# Patient Record
Sex: FEMALE | Race: WHITE | HISPANIC OR LATINO | URBAN - METROPOLITAN AREA
[De-identification: names, ages, dates, MRNs, and addresses within clinical notes are randomized per-mention and may not be internally consistent; named-entity substitution may affect disease eponyms.]

---

## 2018-01-09 ENCOUNTER — EMERGENCY (EMERGENCY)
Facility: HOSPITAL | Age: 58
LOS: 1 days | Discharge: HOME | End: 2018-01-09
Attending: EMERGENCY MEDICINE
Payer: COMMERCIAL

## 2018-01-09 VITALS
RESPIRATION RATE: 18 BRPM | DIASTOLIC BLOOD PRESSURE: 81 MMHG | WEIGHT: 192.02 LBS | OXYGEN SATURATION: 98 % | HEIGHT: 59.84 IN | HEART RATE: 77 BPM | SYSTOLIC BLOOD PRESSURE: 151 MMHG | TEMPERATURE: 103 F

## 2018-01-09 VITALS
DIASTOLIC BLOOD PRESSURE: 74 MMHG | SYSTOLIC BLOOD PRESSURE: 113 MMHG | OXYGEN SATURATION: 98 % | TEMPERATURE: 99 F | HEART RATE: 68 BPM | RESPIRATION RATE: 14 BRPM

## 2018-01-09 LAB
ALBUMIN SERPL ELPH-MCNC: 3.4 G/DL — SIGNIFICANT CHANGE UP (ref 3.3–5.2)
ALP SERPL-CCNC: 80 U/L — SIGNIFICANT CHANGE UP (ref 40–120)
ALT FLD-CCNC: 54 U/L — HIGH
ANION GAP SERPL CALC-SCNC: 16 MMOL/L — SIGNIFICANT CHANGE UP (ref 5–17)
APPEARANCE UR: ABNORMAL
AST SERPL-CCNC: 29 U/L — SIGNIFICANT CHANGE UP
BACTERIA # UR AUTO: ABNORMAL
BASOPHILS # BLD AUTO: 0 K/UL — SIGNIFICANT CHANGE UP (ref 0–0.2)
BASOPHILS NFR BLD AUTO: 0.2 % — SIGNIFICANT CHANGE UP (ref 0–2)
BILIRUB SERPL-MCNC: 0.6 MG/DL — SIGNIFICANT CHANGE UP (ref 0.4–2)
BILIRUB UR-MCNC: ABNORMAL
BUN SERPL-MCNC: 13 MG/DL — SIGNIFICANT CHANGE UP (ref 8–20)
CALCIUM SERPL-MCNC: 8.4 MG/DL — LOW (ref 8.6–10.2)
CHLORIDE SERPL-SCNC: 93 MMOL/L — LOW (ref 98–107)
CO2 SERPL-SCNC: 25 MMOL/L — SIGNIFICANT CHANGE UP (ref 22–29)
COLOR SPEC: YELLOW — SIGNIFICANT CHANGE UP
CREAT SERPL-MCNC: 0.84 MG/DL — SIGNIFICANT CHANGE UP (ref 0.5–1.3)
DIFF PNL FLD: ABNORMAL
EOSINOPHIL # BLD AUTO: 0 K/UL — SIGNIFICANT CHANGE UP (ref 0–0.5)
EOSINOPHIL NFR BLD AUTO: 0 % — SIGNIFICANT CHANGE UP (ref 0–6)
EPI CELLS # UR: SIGNIFICANT CHANGE UP
GLUCOSE SERPL-MCNC: 120 MG/DL — HIGH (ref 70–115)
GLUCOSE UR QL: NEGATIVE MG/DL — SIGNIFICANT CHANGE UP
HCT VFR BLD CALC: 40.3 % — SIGNIFICANT CHANGE UP (ref 37–47)
HGB BLD-MCNC: 13.3 G/DL — SIGNIFICANT CHANGE UP (ref 12–16)
KETONES UR-MCNC: ABNORMAL
LACTATE BLDV-MCNC: 1 MMOL/L — SIGNIFICANT CHANGE UP (ref 0.5–2)
LEUKOCYTE ESTERASE UR-ACNC: ABNORMAL
LYMPHOCYTES # BLD AUTO: 0.5 K/UL — LOW (ref 1–4.8)
LYMPHOCYTES # BLD AUTO: 5.5 % — LOW (ref 20–55)
MCHC RBC-ENTMCNC: 29.2 PG — SIGNIFICANT CHANGE UP (ref 27–31)
MCHC RBC-ENTMCNC: 33 G/DL — SIGNIFICANT CHANGE UP (ref 32–36)
MCV RBC AUTO: 88.4 FL — SIGNIFICANT CHANGE UP (ref 81–99)
MONOCYTES # BLD AUTO: 0.6 K/UL — SIGNIFICANT CHANGE UP (ref 0–0.8)
MONOCYTES NFR BLD AUTO: 6.3 % — SIGNIFICANT CHANGE UP (ref 3–10)
NEUTROPHILS # BLD AUTO: 8.6 K/UL — HIGH (ref 1.8–8)
NEUTROPHILS NFR BLD AUTO: 87.7 % — HIGH (ref 37–73)
NITRITE UR-MCNC: NEGATIVE — SIGNIFICANT CHANGE UP
PH UR: 6 — SIGNIFICANT CHANGE UP (ref 5–8)
PLATELET # BLD AUTO: 198 K/UL — SIGNIFICANT CHANGE UP (ref 150–400)
POTASSIUM SERPL-MCNC: 3.9 MMOL/L — SIGNIFICANT CHANGE UP (ref 3.5–5.3)
POTASSIUM SERPL-SCNC: 3.9 MMOL/L — SIGNIFICANT CHANGE UP (ref 3.5–5.3)
PROT SERPL-MCNC: 7 G/DL — SIGNIFICANT CHANGE UP (ref 6.6–8.7)
PROT UR-MCNC: 100 MG/DL
RBC # BLD: 4.56 M/UL — SIGNIFICANT CHANGE UP (ref 4.4–5.2)
RBC # FLD: 13.8 % — SIGNIFICANT CHANGE UP (ref 11–15.6)
RBC CASTS # UR COMP ASSIST: SIGNIFICANT CHANGE UP /HPF (ref 0–4)
SODIUM SERPL-SCNC: 134 MMOL/L — LOW (ref 135–145)
SP GR SPEC: 1.02 — SIGNIFICANT CHANGE UP (ref 1.01–1.02)
UROBILINOGEN FLD QL: 4 MG/DL
WBC # BLD: 9.8 K/UL — SIGNIFICANT CHANGE UP (ref 4.8–10.8)
WBC # FLD AUTO: 9.8 K/UL — SIGNIFICANT CHANGE UP (ref 4.8–10.8)
WBC UR QL: SIGNIFICANT CHANGE UP

## 2018-01-09 PROCEDURE — 96374 THER/PROPH/DIAG INJ IV PUSH: CPT | Mod: XU

## 2018-01-09 PROCEDURE — 99284 EMERGENCY DEPT VISIT MOD MDM: CPT | Mod: 25

## 2018-01-09 PROCEDURE — 99284 EMERGENCY DEPT VISIT MOD MDM: CPT

## 2018-01-09 PROCEDURE — 87086 URINE CULTURE/COLONY COUNT: CPT

## 2018-01-09 PROCEDURE — T1013: CPT

## 2018-01-09 PROCEDURE — 83605 ASSAY OF LACTIC ACID: CPT

## 2018-01-09 PROCEDURE — 85027 COMPLETE CBC AUTOMATED: CPT

## 2018-01-09 PROCEDURE — 36415 COLL VENOUS BLD VENIPUNCTURE: CPT

## 2018-01-09 PROCEDURE — 80053 COMPREHEN METABOLIC PANEL: CPT

## 2018-01-09 PROCEDURE — 74177 CT ABD & PELVIS W/CONTRAST: CPT | Mod: 26

## 2018-01-09 PROCEDURE — 96375 TX/PRO/DX INJ NEW DRUG ADDON: CPT

## 2018-01-09 PROCEDURE — 71046 X-RAY EXAM CHEST 2 VIEWS: CPT | Mod: 26

## 2018-01-09 PROCEDURE — 74177 CT ABD & PELVIS W/CONTRAST: CPT

## 2018-01-09 PROCEDURE — 71046 X-RAY EXAM CHEST 2 VIEWS: CPT

## 2018-01-09 PROCEDURE — 81001 URINALYSIS AUTO W/SCOPE: CPT

## 2018-01-09 RX ORDER — ACETAMINOPHEN 500 MG
1000 TABLET ORAL ONCE
Qty: 0 | Refills: 0 | Status: COMPLETED | OUTPATIENT
Start: 2018-01-09 | End: 2018-01-09

## 2018-01-09 RX ORDER — ONDANSETRON 8 MG/1
4 TABLET, FILM COATED ORAL ONCE
Qty: 0 | Refills: 0 | Status: COMPLETED | OUTPATIENT
Start: 2018-01-09 | End: 2018-01-09

## 2018-01-09 RX ORDER — SODIUM CHLORIDE 9 MG/ML
3 INJECTION INTRAMUSCULAR; INTRAVENOUS; SUBCUTANEOUS ONCE
Qty: 0 | Refills: 0 | Status: COMPLETED | OUTPATIENT
Start: 2018-01-09 | End: 2018-01-09

## 2018-01-09 RX ORDER — SODIUM CHLORIDE 9 MG/ML
500 INJECTION INTRAMUSCULAR; INTRAVENOUS; SUBCUTANEOUS
Qty: 0 | Refills: 0 | Status: DISCONTINUED | OUTPATIENT
Start: 2018-01-09 | End: 2018-01-13

## 2018-01-09 RX ORDER — PANTOPRAZOLE SODIUM 20 MG/1
40 TABLET, DELAYED RELEASE ORAL ONCE
Qty: 0 | Refills: 0 | Status: COMPLETED | OUTPATIENT
Start: 2018-01-09 | End: 2018-01-09

## 2018-01-09 RX ADMIN — SODIUM CHLORIDE 3 MILLILITER(S): 9 INJECTION INTRAMUSCULAR; INTRAVENOUS; SUBCUTANEOUS at 16:34

## 2018-01-09 RX ADMIN — PANTOPRAZOLE SODIUM 40 MILLIGRAM(S): 20 TABLET, DELAYED RELEASE ORAL at 16:31

## 2018-01-09 RX ADMIN — SODIUM CHLORIDE 2000 MILLILITER(S): 9 INJECTION INTRAMUSCULAR; INTRAVENOUS; SUBCUTANEOUS at 16:30

## 2018-01-09 RX ADMIN — SODIUM CHLORIDE 2000 MILLILITER(S): 9 INJECTION INTRAMUSCULAR; INTRAVENOUS; SUBCUTANEOUS at 16:57

## 2018-01-09 RX ADMIN — ONDANSETRON 4 MILLIGRAM(S): 8 TABLET, FILM COATED ORAL at 16:33

## 2018-01-09 RX ADMIN — Medication 400 MILLIGRAM(S): at 16:57

## 2018-01-09 NOTE — ED STATDOCS - ATTENDING CONTRIBUTION TO CARE
I, La Mullen, performed the initial face to face bedside interview with this patient regarding history of present illness, review of symptoms and relevant past medical, social and family history.  I completed an independent physical examination.  I was the initial provider who evaluated this patient. I have signed out the follow up of any pending tests (i.e. labs, radiological studies) to the ACP.  I have communicated the patient’s plan of care and disposition with the ACP.

## 2018-01-09 NOTE — ED ADULT NURSE NOTE - OBJECTIVE STATEMENT
pt arrived to ED c/o of cough, upper abdominal pain radiating to flank, fever x 3 days. Pt describes pain as constant 9 out of 10 pain. Some constipation. Denies nausea, vomiting, diarrhea, sore throat, urinary symptoms.

## 2018-01-09 NOTE — ED ADULT TRIAGE NOTE - CHIEF COMPLAINT QUOTE
pt c/o fevers since sunday with pain to abd and lack of appetite, pt reports took motrin at 11 am  no urinary s/s  no vomiting  no diarrhea

## 2018-01-09 NOTE — ED STATDOCS - CARE PLAN
Principal Discharge DX:	Bronchitis  Instructions for follow-up, activity and diet:	take medication as directed. Follow up w PMD within 3 days. Return to ED w worsening symptoms.

## 2018-01-09 NOTE — ED STATDOCS - MEDICAL DECISION MAKING DETAILS
pt with abd pain and fever. unclear etiology. will give abx, CT, do sepsis workup pt with abd pain, cough and fever. unclear etiology. will give abx, CT, do sepsis workup

## 2018-01-09 NOTE — ED STATDOCS - PROGRESS NOTE DETAILS
PA NOTE:  Pt seen by intake physician and HPI/ROS/PE/MDM reviewed. PT presenting to ED with complaints of...  PE: GEN: Awake, alert, interactive, NAD, non-toxic appearing. EYES: PERRL CARDIAC: Reg rate and rhythm, S1,S2, no murmur/rub/gallop. Strong central and peripheral pulses, Brisk cap refill, no evident pedal edema. RESP: No distress noted. L/S clear = Bilat without accessory muscle use, wheeze, rhonchi, rales. ABD: soft, supple, non-tender, no guarding. BS x 4, normoactive. NEURO: AOx3, CN II-XII grossly intact without focal deficit. MSK: Moving all extremities with no apparent deformities. SKIN: Warm, dry, normal color, without apparent rashes.  PLAN: PA NOTE:  Pt seen by intake physician and HPI/ROS/PE/MDM reviewed. PT presenting to ED with complaints of   PE: GEN: Awake, alert, interactive, NAD, non-toxic appearing. EYES: PERRL CARDIAC: Reg rate and rhythm, S1,S2, no murmur/rub/gallop. Strong central and peripheral pulses, Brisk cap refill, no evident pedal edema. RESP: No distress noted. L/S clear = Bilat without accessory muscle use, wheeze, rhonchi, rales. ABD: soft, supple, non-tender, no guarding. BS x 4, normoactive. NEURO: AOx3, CN II-XII grossly intact without focal deficit. MSK: Moving all extremities with no apparent deformities. SKIN: Warm, dry, normal color, without apparent rashes.  PLAN: CT, CXR, labs PA NOTE:  Pt seen by intake physician and HPI/ROS/PE/MDM reviewed. PT presenting to ED with complaints of cough, fever, abdominal pain for past three days. Pt denies chest pain, sob, palpitations, hemoptysis, nausea/vomiting, diarrhea    PE: GEN: Morbidly obese, awake, alert, interactive, NAD, non-toxic appearing. EYES: PERRL CARDIAC: Reg rate and rhythm, S1,S2, no murmur/rub/gallop. Strong central and peripheral pulses, Brisk cap refill, no evident pedal edema. RESP: No distress noted. L/S clear = Bilat without accessory muscle use, wheeze, rhonchi, rales. ABD: soft, distended, protuberant abdomen,  non-tender, no guarding. BS x 4, normoactive. NEURO: AOx3, CN II-XII grossly intact without focal deficit. MSK: Moving all extremities with no apparent deformities. SKIN: Warm, dry, normal color, without apparent rashes.  PLAN: CT, CXR, labs Pt informed all results through . Discussed incidental findings enlarged lymph nodes on CT as well as hepatic nodule, and pulmonary congestion on CXR. Pt to be discharged to home w RX for Augmentin, advised to take as directed and to completion. ADvised to follow up w PMD within 3 days. Printout given of all results to bring to PMD. Pt expresses understanding and agreement with plan of discharge and follow up. Pt feels well, is afebrile and requesting discharge home. NAD noted and VSS.

## 2018-01-09 NOTE — ED STATDOCS - OBJECTIVE STATEMENT
56 y/o F pt presents to ED c/o of cough, upper abdominal pain radiating to flank, fever x 3 days. Pt describes pain as constant 9 out of 10 pain. Some constipation. Denies nausea, vomiting, diarrhea, sore throat, urinary symptoms. Denies similar symptoms in past.

## 2018-01-10 LAB
CULTURE RESULTS: SIGNIFICANT CHANGE UP
SPECIMEN SOURCE: SIGNIFICANT CHANGE UP

## 2020-09-23 ENCOUNTER — EMERGENCY (EMERGENCY)
Facility: HOSPITAL | Age: 60
LOS: 1 days | Discharge: HOME | End: 2020-09-23
Attending: EMERGENCY MEDICINE
Payer: COMMERCIAL

## 2020-09-23 VITALS
HEIGHT: 62 IN | DIASTOLIC BLOOD PRESSURE: 97 MMHG | WEIGHT: 190.04 LBS | RESPIRATION RATE: 18 BRPM | OXYGEN SATURATION: 98 % | TEMPERATURE: 98 F | HEART RATE: 87 BPM | SYSTOLIC BLOOD PRESSURE: 159 MMHG

## 2020-09-23 VITALS
HEART RATE: 69 BPM | OXYGEN SATURATION: 98 % | DIASTOLIC BLOOD PRESSURE: 80 MMHG | SYSTOLIC BLOOD PRESSURE: 124 MMHG | TEMPERATURE: 98 F | RESPIRATION RATE: 18 BRPM

## 2020-09-23 LAB
ALBUMIN SERPL ELPH-MCNC: 3.9 G/DL — SIGNIFICANT CHANGE UP (ref 3.3–5.2)
ALP SERPL-CCNC: 83 U/L — SIGNIFICANT CHANGE UP (ref 40–120)
ALT FLD-CCNC: 23 U/L — SIGNIFICANT CHANGE UP
ANION GAP SERPL CALC-SCNC: 11 MMOL/L — SIGNIFICANT CHANGE UP (ref 5–17)
AST SERPL-CCNC: 14 U/L — SIGNIFICANT CHANGE UP
BASOPHILS # BLD AUTO: 0.07 K/UL — SIGNIFICANT CHANGE UP (ref 0–0.2)
BASOPHILS NFR BLD AUTO: 1.2 % — SIGNIFICANT CHANGE UP (ref 0–2)
BILIRUB SERPL-MCNC: <0.2 MG/DL — LOW (ref 0.4–2)
BUN SERPL-MCNC: 11 MG/DL — SIGNIFICANT CHANGE UP (ref 8–20)
CALCIUM SERPL-MCNC: 8.8 MG/DL — SIGNIFICANT CHANGE UP (ref 8.6–10.2)
CHLORIDE SERPL-SCNC: 103 MMOL/L — SIGNIFICANT CHANGE UP (ref 98–107)
CO2 SERPL-SCNC: 25 MMOL/L — SIGNIFICANT CHANGE UP (ref 22–29)
CREAT SERPL-MCNC: 0.66 MG/DL — SIGNIFICANT CHANGE UP (ref 0.5–1.3)
EOSINOPHIL # BLD AUTO: 0.16 K/UL — SIGNIFICANT CHANGE UP (ref 0–0.5)
EOSINOPHIL NFR BLD AUTO: 2.7 % — SIGNIFICANT CHANGE UP (ref 0–6)
GLUCOSE SERPL-MCNC: 105 MG/DL — HIGH (ref 70–99)
HCT VFR BLD CALC: 38.2 % — SIGNIFICANT CHANGE UP (ref 34.5–45)
HGB BLD-MCNC: 12.4 G/DL — SIGNIFICANT CHANGE UP (ref 11.5–15.5)
IMM GRANULOCYTES NFR BLD AUTO: 0.2 % — SIGNIFICANT CHANGE UP (ref 0–1.5)
LYMPHOCYTES # BLD AUTO: 1.49 K/UL — SIGNIFICANT CHANGE UP (ref 1–3.3)
LYMPHOCYTES # BLD AUTO: 24.9 % — SIGNIFICANT CHANGE UP (ref 13–44)
MCHC RBC-ENTMCNC: 29.3 PG — SIGNIFICANT CHANGE UP (ref 27–34)
MCHC RBC-ENTMCNC: 32.5 GM/DL — SIGNIFICANT CHANGE UP (ref 32–36)
MCV RBC AUTO: 90.3 FL — SIGNIFICANT CHANGE UP (ref 80–100)
MONOCYTES # BLD AUTO: 0.62 K/UL — SIGNIFICANT CHANGE UP (ref 0–0.9)
MONOCYTES NFR BLD AUTO: 10.4 % — SIGNIFICANT CHANGE UP (ref 2–14)
NEUTROPHILS # BLD AUTO: 3.63 K/UL — SIGNIFICANT CHANGE UP (ref 1.8–7.4)
NEUTROPHILS NFR BLD AUTO: 60.6 % — SIGNIFICANT CHANGE UP (ref 43–77)
PLATELET # BLD AUTO: 355 K/UL — SIGNIFICANT CHANGE UP (ref 150–400)
POTASSIUM SERPL-MCNC: 4.5 MMOL/L — SIGNIFICANT CHANGE UP (ref 3.5–5.3)
POTASSIUM SERPL-SCNC: 4.5 MMOL/L — SIGNIFICANT CHANGE UP (ref 3.5–5.3)
PROT SERPL-MCNC: 7 G/DL — SIGNIFICANT CHANGE UP (ref 6.6–8.7)
RBC # BLD: 4.23 M/UL — SIGNIFICANT CHANGE UP (ref 3.8–5.2)
RBC # FLD: 12.3 % — SIGNIFICANT CHANGE UP (ref 10.3–14.5)
SODIUM SERPL-SCNC: 139 MMOL/L — SIGNIFICANT CHANGE UP (ref 135–145)
TROPONIN T SERPL-MCNC: <0.01 NG/ML — SIGNIFICANT CHANGE UP (ref 0–0.06)
TROPONIN T SERPL-MCNC: <0.01 NG/ML — SIGNIFICANT CHANGE UP (ref 0–0.06)
WBC # BLD: 5.98 K/UL — SIGNIFICANT CHANGE UP (ref 3.8–10.5)
WBC # FLD AUTO: 5.98 K/UL — SIGNIFICANT CHANGE UP (ref 3.8–10.5)

## 2020-09-23 PROCEDURE — 99284 EMERGENCY DEPT VISIT MOD MDM: CPT | Mod: 25

## 2020-09-23 PROCEDURE — 71045 X-RAY EXAM CHEST 1 VIEW: CPT

## 2020-09-23 PROCEDURE — 80053 COMPREHEN METABOLIC PANEL: CPT

## 2020-09-23 PROCEDURE — 36415 COLL VENOUS BLD VENIPUNCTURE: CPT

## 2020-09-23 PROCEDURE — 96374 THER/PROPH/DIAG INJ IV PUSH: CPT

## 2020-09-23 PROCEDURE — 99285 EMERGENCY DEPT VISIT HI MDM: CPT

## 2020-09-23 PROCEDURE — 85025 COMPLETE CBC W/AUTO DIFF WBC: CPT

## 2020-09-23 PROCEDURE — 93010 ELECTROCARDIOGRAM REPORT: CPT | Mod: 76

## 2020-09-23 PROCEDURE — 71045 X-RAY EXAM CHEST 1 VIEW: CPT | Mod: 26

## 2020-09-23 PROCEDURE — 93005 ELECTROCARDIOGRAM TRACING: CPT

## 2020-09-23 PROCEDURE — 84484 ASSAY OF TROPONIN QUANT: CPT

## 2020-09-23 RX ORDER — LIDOCAINE 4 G/100G
10 CREAM TOPICAL ONCE
Refills: 0 | Status: COMPLETED | OUTPATIENT
Start: 2020-09-23 | End: 2020-09-23

## 2020-09-23 RX ORDER — FAMOTIDINE 10 MG/ML
20 INJECTION INTRAVENOUS ONCE
Refills: 0 | Status: COMPLETED | OUTPATIENT
Start: 2020-09-23 | End: 2020-09-23

## 2020-09-23 RX ADMIN — FAMOTIDINE 20 MILLIGRAM(S): 10 INJECTION INTRAVENOUS at 14:05

## 2020-09-23 RX ADMIN — LIDOCAINE 10 MILLILITER(S): 4 CREAM TOPICAL at 14:04

## 2020-09-23 RX ADMIN — Medication 30 MILLILITER(S): at 14:04

## 2020-09-23 NOTE — ED ADULT NURSE NOTE - OBJECTIVE STATEMENT
Patient presents to ER complaining of epigastric pain, patient reports onset of symptoms Friday, denies N/V, resp even/unlabored, lungs CTAB, patient was seen at Urgent care and advised to come to ER for further evaluation.

## 2020-09-23 NOTE — ED ADULT NURSE NOTE - PAIN: PRESENCE, MLM
complains of pain/discomfort
gait, locomotion, and balance/muscle strength/aerobic capacity/endurance

## 2020-09-23 NOTE — ED PROVIDER NOTE - NS ED ROS FT
CONSTITUTIONAL: No fevers, no chills  Eyes: No vision changes  Cardiovascular: +Chest pain  Respiratory: No SOB  Gastrointestinal: No n/v/c/d, +abd pain  Genitourinary: no dysuria, no hematuria  SKIN: no rashes.  MSK: no weakness, no myalgias, no arthralgias  NEURO: no headache, no weakness, no numbness  PSYCHIATRIC: no known mental health issues.

## 2020-09-23 NOTE — ED PROVIDER NOTE - NSFOLLOWUPINSTRUCTIONS_ED_ALL_ED_FT
-- Please follow up with your doctor(s) within the next 3 days, but seek medical attention sooner if your symptoms persist or worsen.  Please call tomorrow for an appointment.  If you cannot follow-up with your primary doctor please return to the ED for any urgent issues.    -- You were given a copy of your labs and imaging.  Please go over these with your doctor(s).     -- If you have any worsening of symptoms or any other concerns please see your doctor or return to the nearest emergency room immediately.    -- Please continue taking your home medications as directed.  Do not use alcohol when taking any medication (especially antibiotics, tylenol or other pain medication) unless you check with the doctor or pharmacist.    **************************************************************************************************************  Please return to the emergency department immediately should you feel worse in any way or have any of the following symptoms:    •	especially increased or different pain  •	 fevers  •	persistent vomiting  •	shaking chills    Please return to the emergency department for a recheck in 12 hours so we can re-evaluate you and ensure that you are not developing a problem that would require surgery or hospitalization.      Please follow up with the Doctor listed within the time frame specified. Thank you for coming to the emergency department. We hope you are feeling improved and continue to get better. Have a nice day.    Chest Pain    Chest pain can be caused by many different conditions which may or may not be dangerous. Causes include heartburn, lung infections, heart attack, blood clot in lungs, skin infections, strain or damage to muscle, cartilage, or bones, etc. In addition to a history and physical examination, an electrocardiogram (ECG) or other lab tests may have been performed to determine the cause of your chest pain. Follow up with your primary care provider or with a cardiologist as instructed.     SEEK IMMEDIATE MEDICAL CARE IF YOU HAVE ANY OF THE FOLLOWING SYMPTOMS: worsening chest pain, coughing up blood, unexplained back/neck/jaw pain, severe abdominal pain, dizziness or lightheadedness, fainting, shortness of breath, sweaty or clammy skin, vomiting, or racing heart beat. These symptoms may represent a serious problem that is an emergency. Do not wait to see if the symptoms will go away. Get medical help right away. Call 911 and do not drive yourself to the hospital.

## 2020-09-23 NOTE — ED PROVIDER NOTE - ATTENDING CONTRIBUTION TO CARE
AJM: pt with epigastric and chest pain. symptoms intermittent for several days. started again this morning at 8 am. will obtain delta trop, ecg, labs, cxr, gi cocktail. pt has cards follow up will dc if neg workup with close follow up and return precautions.

## 2020-09-23 NOTE — ED PROVIDER NOTE - PHYSICAL EXAMINATION
General: well appearing, interactive, well nourished, NAD  HEENT: pupils equal and reactive, normal external ears bilaterally   Cardiac: RRR, no MRG appreciated  Resp: lungs clear to auscultation bilaterally, symmetric chest wall rise  Abd: soft, nontender, nondistended   : no CVA tenderness  Neuro: Moving all extremities  Skin:  vitiligo, normal color for race

## 2020-09-23 NOTE — ED ADULT TRIAGE NOTE - CHIEF COMPLAINT QUOTE
pt with c/o epigastric pain and left chest pain for past couple days, told by urgent care to come to ED.

## 2020-09-23 NOTE — ED PROVIDER NOTE - PATIENT PORTAL LINK FT
You can access the FollowMyHealth Patient Portal offered by [facility] by registering at the following website: [email or web address]. By joining [employer]’s FollowMyHealth portal, you will also be able to view your health information using other applications (apps) compatible with our system.

## 2020-09-23 NOTE — ED PROVIDER NOTE - OBJECTIVE STATEMENT
Pt is a 59 y/o F w/PMHx HTN presents c/o epigastric.  Pt states the pain started earlier in the week and she initially thought she had an upset stomach.  Today the pain began with radiation to her left chest and she called her PMD, was told to come to the ED.  Pt denies shortness of breath, n/v, leg swelling.

## 2020-09-23 NOTE — ED PROVIDER NOTE - CLINICAL SUMMARY MEDICAL DECISION MAKING FREE TEXT BOX
Pt is a 61 y/o F c/o epigastric pain w/radiation to chest, will get labs, trend trop, cxr, ekg, pt has good f/u

## 2020-09-23 NOTE — ED PROVIDER NOTE - PROGRESS NOTE DETAILS
AJM: pt feeling improved. workup neg. has card for follow up. All results discussed with the patient, and a copy of results has been provided. Patient is comfortable with dc plan for home. Opportunity for questions was provided and all questions have been addressed. Patient understands when to return to ED if symptoms worsen.

## 2021-01-07 ENCOUNTER — APPOINTMENT (OUTPATIENT)
Dept: OBGYN | Facility: CLINIC | Age: 61
End: 2021-01-07
Payer: MEDICAID

## 2021-01-07 VITALS
DIASTOLIC BLOOD PRESSURE: 72 MMHG | HEIGHT: 62 IN | BODY MASS INDEX: 36.28 KG/M2 | WEIGHT: 197.13 LBS | SYSTOLIC BLOOD PRESSURE: 130 MMHG

## 2021-01-07 DIAGNOSIS — Z78.9 OTHER SPECIFIED HEALTH STATUS: ICD-10-CM

## 2021-01-07 DIAGNOSIS — N76.2 ACUTE VULVITIS: ICD-10-CM

## 2021-01-07 DIAGNOSIS — N90.5 ATROPHY OF VULVA: ICD-10-CM

## 2021-01-07 DIAGNOSIS — Z86.79 PERSONAL HISTORY OF OTHER DISEASES OF THE CIRCULATORY SYSTEM: ICD-10-CM

## 2021-01-07 DIAGNOSIS — Z84.1 FAMILY HISTORY OF DISORDERS OF KIDNEY AND URETER: ICD-10-CM

## 2021-01-07 PROCEDURE — 99072 ADDL SUPL MATRL&STAF TM PHE: CPT

## 2021-01-07 PROCEDURE — 99386 PREV VISIT NEW AGE 40-64: CPT

## 2021-01-08 LAB
C TRACH RRNA SPEC QL NAA+PROBE: NOT DETECTED
HPV HIGH+LOW RISK DNA PNL CVX: NOT DETECTED
N GONORRHOEA RRNA SPEC QL NAA+PROBE: NOT DETECTED
SOURCE TP AMPLIFICATION: NORMAL

## 2021-01-12 LAB — CYTOLOGY CVX/VAG DOC THIN PREP: NORMAL

## 2022-11-06 ENCOUNTER — EMERGENCY (EMERGENCY)
Facility: HOSPITAL | Age: 62
LOS: 1 days | Discharge: HOME | End: 2022-11-06
Attending: EMERGENCY MEDICINE
Payer: COMMERCIAL

## 2022-11-06 VITALS
OXYGEN SATURATION: 99 % | TEMPERATURE: 99 F | RESPIRATION RATE: 16 BRPM | HEART RATE: 76 BPM | DIASTOLIC BLOOD PRESSURE: 70 MMHG | SYSTOLIC BLOOD PRESSURE: 119 MMHG

## 2022-11-06 VITALS
SYSTOLIC BLOOD PRESSURE: 106 MMHG | OXYGEN SATURATION: 98 % | HEART RATE: 66 BPM | DIASTOLIC BLOOD PRESSURE: 55 MMHG | RESPIRATION RATE: 18 BRPM

## 2022-11-06 LAB
ALBUMIN SERPL ELPH-MCNC: 4.5 G/DL — SIGNIFICANT CHANGE UP (ref 3.3–5.2)
ALP SERPL-CCNC: 82 U/L — SIGNIFICANT CHANGE UP (ref 40–120)
ALT FLD-CCNC: 15 U/L — SIGNIFICANT CHANGE UP
ANION GAP SERPL CALC-SCNC: 10 MMOL/L — SIGNIFICANT CHANGE UP (ref 5–17)
AST SERPL-CCNC: 14 U/L — SIGNIFICANT CHANGE UP
BILIRUB SERPL-MCNC: 0.3 MG/DL — LOW (ref 0.4–2)
BUN SERPL-MCNC: 24.4 MG/DL — HIGH (ref 8–20)
CALCIUM SERPL-MCNC: 9.2 MG/DL — SIGNIFICANT CHANGE UP (ref 8.4–10.5)
CHLORIDE SERPL-SCNC: 101 MMOL/L — SIGNIFICANT CHANGE UP (ref 96–108)
CO2 SERPL-SCNC: 26 MMOL/L — SIGNIFICANT CHANGE UP (ref 22–29)
CREAT SERPL-MCNC: 1.18 MG/DL — SIGNIFICANT CHANGE UP (ref 0.5–1.3)
EGFR: 52 ML/MIN/1.73M2 — LOW
GLUCOSE SERPL-MCNC: 100 MG/DL — HIGH (ref 70–99)
HCT VFR BLD CALC: 38.5 % — SIGNIFICANT CHANGE UP (ref 34.5–45)
HGB BLD-MCNC: 12.7 G/DL — SIGNIFICANT CHANGE UP (ref 11.5–15.5)
MCHC RBC-ENTMCNC: 30 PG — SIGNIFICANT CHANGE UP (ref 27–34)
MCHC RBC-ENTMCNC: 33 GM/DL — SIGNIFICANT CHANGE UP (ref 32–36)
MCV RBC AUTO: 91 FL — SIGNIFICANT CHANGE UP (ref 80–100)
PLATELET # BLD AUTO: 347 K/UL — SIGNIFICANT CHANGE UP (ref 150–400)
POTASSIUM SERPL-MCNC: 5 MMOL/L — SIGNIFICANT CHANGE UP (ref 3.5–5.3)
POTASSIUM SERPL-SCNC: 5 MMOL/L — SIGNIFICANT CHANGE UP (ref 3.5–5.3)
PROT SERPL-MCNC: 7.1 G/DL — SIGNIFICANT CHANGE UP (ref 6.6–8.7)
RBC # BLD: 4.23 M/UL — SIGNIFICANT CHANGE UP (ref 3.8–5.2)
RBC # FLD: 13 % — SIGNIFICANT CHANGE UP (ref 10.3–14.5)
SODIUM SERPL-SCNC: 137 MMOL/L — SIGNIFICANT CHANGE UP (ref 135–145)
TROPONIN T SERPL-MCNC: <0.01 NG/ML — SIGNIFICANT CHANGE UP (ref 0–0.06)
WBC # BLD: 7.68 K/UL — SIGNIFICANT CHANGE UP (ref 3.8–10.5)
WBC # FLD AUTO: 7.68 K/UL — SIGNIFICANT CHANGE UP (ref 3.8–10.5)

## 2022-11-06 PROCEDURE — 80053 COMPREHEN METABOLIC PANEL: CPT

## 2022-11-06 PROCEDURE — 99285 EMERGENCY DEPT VISIT HI MDM: CPT

## 2022-11-06 PROCEDURE — 84484 ASSAY OF TROPONIN QUANT: CPT

## 2022-11-06 PROCEDURE — 73030 X-RAY EXAM OF SHOULDER: CPT

## 2022-11-06 PROCEDURE — 85027 COMPLETE CBC AUTOMATED: CPT

## 2022-11-06 PROCEDURE — 36415 COLL VENOUS BLD VENIPUNCTURE: CPT

## 2022-11-06 PROCEDURE — 71045 X-RAY EXAM CHEST 1 VIEW: CPT | Mod: 26

## 2022-11-06 PROCEDURE — 93010 ELECTROCARDIOGRAM REPORT: CPT

## 2022-11-06 PROCEDURE — 71045 X-RAY EXAM CHEST 1 VIEW: CPT

## 2022-11-06 PROCEDURE — 93005 ELECTROCARDIOGRAM TRACING: CPT

## 2022-11-06 PROCEDURE — 73030 X-RAY EXAM OF SHOULDER: CPT | Mod: 26,LT

## 2022-11-06 RX ORDER — IBUPROFEN 200 MG
1 TABLET ORAL
Qty: 20 | Refills: 0
Start: 2022-11-06 | End: 2022-11-10

## 2022-11-06 RX ORDER — CYCLOBENZAPRINE HYDROCHLORIDE 10 MG/1
1 TABLET, FILM COATED ORAL
Qty: 15 | Refills: 0
Start: 2022-11-06 | End: 2022-11-10

## 2022-11-06 RX ORDER — IBUPROFEN 200 MG
400 TABLET ORAL ONCE
Refills: 0 | Status: COMPLETED | OUTPATIENT
Start: 2022-11-06 | End: 2022-11-06

## 2022-11-06 RX ORDER — ACETAMINOPHEN 500 MG
650 TABLET ORAL ONCE
Refills: 0 | Status: COMPLETED | OUTPATIENT
Start: 2022-11-06 | End: 2022-11-06

## 2022-11-06 RX ADMIN — Medication 650 MILLIGRAM(S): at 10:27

## 2022-11-06 RX ADMIN — Medication 400 MILLIGRAM(S): at 11:16

## 2022-11-06 RX ADMIN — Medication 400 MILLIGRAM(S): at 10:26

## 2022-11-06 RX ADMIN — Medication 650 MILLIGRAM(S): at 11:16

## 2022-11-06 NOTE — ED ADULT NURSE NOTE - OBJECTIVE STATEMENT
pt has been c/o pain to her left arm and side since thursday.  4/10  pt has appt with cardioliogy but wanted to be evulated as the pain has been getting worse.  denies any SOB.  Pt is A&Ox4

## 2022-11-06 NOTE — ED PROVIDER NOTE - NS ED ROS FT
Review of Systems  CONSTITUTIONAL: afebrile w/no diaphoresis or weight changes  SKIN: warm, dry w/ no rash or bleeding  EYES: no changes to vision  ENT: no changes in hearing, no sore throat  RESPIRATORY: no cough or SOB  CARDIAC: no chest pain & no palpitations  GI: no abd pain, nausea, vomiting, diarrhea, constipation, or blood in stool/mee blood  GENITO-URINARY: no discharge, dysuria or hematuria,   MUSCULOSKELETAL:  +LEFT SHOULDER PAIN, LEFT ARM TINGLING.   NEUROLOGIC: no weakness, headache, anesthesia or paresthesias  PSYCH: no anxiety, non suicidal, non homicidal, without hallucinations or depression

## 2022-11-06 NOTE — ED PROVIDER NOTE - CLINICAL SUMMARY MEDICAL DECISION MAKING FREE TEXT BOX
ASSESSMENT:   SERENITY RODAS is a 61yo F who presented with left shoulder pain. EKG w/o acute changes. Physical exam suggestive of msk source. Cxr clear.   Plan:  Symptomatic control, reassurance and education. Return precautions and outpatient cardiology follow up.

## 2022-11-06 NOTE — ED PROVIDER NOTE - PATIENT PORTAL LINK FT
You can access the FollowMyHealth Patient Portal offered by [facility] by registering at the following website: [email or web address]. By joining [employer]’s FollowMyHealth portal, you will also be able to view your health information using other applications (apps) compatible with our system.

## 2022-11-06 NOTE — ED ADULT NURSE NOTE - CHIEF COMPLAINT QUOTE
left sided chest/flank pain c/o burning radiating to her mid chest. seen for same at [facility] and told to follow up with primary but it was saturday. has appointment with cardiology next week. here because she has a lot of pain.

## 2022-11-06 NOTE — ED PROVIDER NOTE - PHYSICAL EXAMINATION
VITAL SIGNS: I have reviewed vital signs  CONSTITUTIONAL:  in no acute distress.  SKIN: Warm, dry, no rash.  HEAD: Normocephalic, atraumatic.  EYES: PERRL, conjunctiva and sclera clear.  ENT: pink & moist mucosa, no pharyngeal erythema  NECK: Supple, non tender. No cervical lymphadenopathy.  CARD: Regular rate and rhythm. No murmurs.   RESP: No wheezes, rales or rhonchi.   ABD:  soft, non-distended, non-tender.   MSK:  Good ROM in upper extremity. No masses, bony deformities on inspection. TTP in trapezius. Marked weakness against active resistance in left shoulder.   NEURO: Alert, oriented. Grossly unremarkable. No focal deficits.   PSYCH: Cooperative, alert & oriented x3

## 2022-11-06 NOTE — ED PROVIDER NOTE - CARE PROVIDER_API CALL
Benjamin Klein)  Cardiovascular Disease; Internal Medicine; Interventional Cardiology  [address], [address]  [place] [address]  Phone: [phone]  Fax: [phone]  Established Patient  Follow Up Time: Urgent

## 2022-11-06 NOTE — ED PROVIDER NOTE - ATTENDING CONTRIBUTION TO CARE
I personally saw the patient with the resident, and completed the key components of the history and physical exam. I then discussed the management plan with the resident.    61 y/o F with PMH vitiligo presents for left shoulder pain that has been present for the past 4 days - she lifts heavy boxes at work - had work up at [facility] which was negative, recommended outpatient follow up.    PE - NAD, well appearing, RRR, lungs CTA B/L, + reproducible left posterior scap TTP with trigger point, full ROM of the left shoulder, symmetrical  strength, 2+ symmetrical radial pulses, no midline cervical TTP or step offs.    EKG WNL, labs WNL, CXR clear - likely MSK - Rx sent to pharmacy, recommended outpatient follow up to cardiology.

## 2022-11-06 NOTE — ED PROVIDER NOTE - PROGRESS NOTE DETAILS
Karin: Patient reassessed, states her pain improved and is now coming back, worse with movement of her left arm. I shared results, patient is comfortable with discharge - Rx sent to pharmacy, outpatient follow up with Dr. Klein.

## 2022-11-06 NOTE — ED PROVIDER NOTE - OBJECTIVE STATEMENT
ALICE RODAS is a 63yo F with PMH vitiligo who presents c/o left shoulder pain. Pt states she has been having left shoulder pain since Thursday. States the pain came on suddenly and has been persistent but waxing/waning in intensity since that time.  Today she also noticed tingling down the left arm. She has not taken any medications for the pain. She denies associated sob, chest pain, abdominal pain, nausea. She works packing and lifting boxes but is unsure if the pain is related to repetitive movements at work.

## 2022-11-12 ENCOUNTER — EMERGENCY (EMERGENCY)
Facility: HOSPITAL | Age: 62
LOS: 1 days | Discharge: HOME | End: 2022-11-12
Attending: EMERGENCY MEDICINE
Payer: COMMERCIAL

## 2022-11-12 VITALS
TEMPERATURE: 99 F | WEIGHT: 184.97 LBS | HEART RATE: 76 BPM | SYSTOLIC BLOOD PRESSURE: 96 MMHG | DIASTOLIC BLOOD PRESSURE: 66 MMHG | OXYGEN SATURATION: 97 % | RESPIRATION RATE: 17 BRPM

## 2022-11-12 PROCEDURE — 99283 EMERGENCY DEPT VISIT LOW MDM: CPT

## 2022-11-12 RX ORDER — IBUPROFEN 200 MG
600 TABLET ORAL ONCE
Refills: 0 | Status: COMPLETED | OUTPATIENT
Start: 2022-11-12 | End: 2022-11-12

## 2022-11-12 RX ORDER — VALACYCLOVIR 500 MG/1
1 TABLET, FILM COATED ORAL
Qty: 21 | Refills: 0
Start: 2022-11-12 | End: 2022-11-18

## 2022-11-12 RX ORDER — VALACYCLOVIR 500 MG/1
1000 TABLET, FILM COATED ORAL ONCE
Refills: 0 | Status: COMPLETED | OUTPATIENT
Start: 2022-11-12 | End: 2022-11-12

## 2022-11-12 RX ADMIN — Medication 600 MILLIGRAM(S): at 15:31

## 2022-11-12 RX ADMIN — VALACYCLOVIR 1000 MILLIGRAM(S): 500 TABLET, FILM COATED ORAL at 15:31

## 2022-11-12 RX ADMIN — Medication 600 MILLIGRAM(S): at 15:36

## 2022-11-12 NOTE — ED PROVIDER NOTE - OBJECTIVE STATEMENT
Patient is a 62-year-old female with history of vitiligo presenting with left-sided chest pain and rash.  Patient seen here approximately 1 week ago for left-sided chest and shoulder pain with negative cardiac work-up.  She notes 2 days later the rash developed.  Pain is described as burning.  No fevers or chills   no right-sided symptoms.  No focal weakness or numbness.  No shortness of breath, lightheadedness, palpitations, syncope.  No lower extremity pain or swelling.  Has never had this before.  No meds for symptoms.  Patient is not immunocompromised and is not around pregnant or immunocompromised people.

## 2022-11-12 NOTE — ED ADULT NURSE NOTE - OBJECTIVE STATEMENT
Patient presented to ED complaining of rash, Left facial and neck pain. Patient medicated and discharged

## 2022-11-12 NOTE — ED ADULT NURSE NOTE - CHPI ED NUR SYMPTOMS NEG
Pt arrived with rapid afib , +UTI, +IGOR no chills/no decreased eating/drinking/no dizziness/no fever/no nausea/no tingling/no vomiting/no weakness

## 2022-11-12 NOTE — ED PROVIDER NOTE - CLINICAL SUMMARY MEDICAL DECISION MAKING FREE TEXT BOX
Patient presenting with shingles.  No signs of complication.  Will treat with valacyclovir and discharged home with prescription.  PMD follow-up.  Patient instructed to stay away from pregnant women and those who are immunocompromised.   used for encounter.

## 2022-11-12 NOTE — ED PROVIDER NOTE - PATIENT PORTAL LINK FT
You can access the FollowMyHealth Patient Portal offered by [facility] by registering at the following website: [email or web address]. By joining [employer]’s FollowMyHealth portal, you will also be able to view your health information using other applications (apps) compatible with our system.

## 2022-11-12 NOTE — ED PROVIDER NOTE - SKIN, MLM
herpetic rash noted to left chest radiating to axilla and left side of back.  Rash does not cross midline.  Mild tenderness to palpation.  Positive scabs.  No signs of superimposed bacterial infection.

## 2022-11-12 NOTE — ED PROVIDER NOTE - NSFOLLOWUPINSTRUCTIONS_ED_ALL_ED_FT
La culebrilla, que también se conoce eloise herpes zoster, es reinier infección que causa reinier erupción cutánea dolorosa y ampollas llenas de líquido. Es causado por un virus.  La culebrilla solo se desarrolla en personas que:  Ha tenido varicela. Alma recibido un medicamento para protegerse contra la varicela (alma sido vacunado). La culebrilla es stalin en lois demetrice.  ¿Cuales son las causas? La culebrilla es causada por el virus varicela-zoster (VZV). Lois es el mismo virus que causa la varicela. Después de que reinier persona se expone al VZV, el virus permanece en el cuerpo en un estado inactivo (latente). La culebrilla se desarrolla si el virus se reactiva. [place] puede suceder muchos años después de la primera exposición (inicial) al VZV. No se sabe qué hace que lois virus se reactive.  ¿Qué aumenta el riesgo? Las personas que navarrete tenido varicela o que recibieron la vacuna contra la varicela corren el riesgo de contraer herpes zóster. La infección por culebrilla es más común en personas que:  Son mayores de 60 años. Tienen un sistema de saranya contra enfermedades (sistema inmunitario) debilitado, eloise las personas con:  VIH. SIDA. Cáncer. Está tomando medicamentos que debilitan el sistema inmunitario, eloise medicamentos para trasplantes. Están experimentando mucho estrés.  ¿Cuáles son los signos o síntomas? Los primeros síntomas de esta afección incluyen picazón, hormigueo y dolor en un área de la piel. El dolor puede describirse eloise quemante, punzante o palpitante.  Unos días o semanas después de que comienzan los primeros síntomas, aparece un doloroso sarpullido doty. La erupción suele estar en un lado del cuerpo y tiene un patrón similar a reinier plasencia o un cinturón. La erupción finalmente se convierte en ampollas llenas de líquido que se abren, se transforman en costras y se secan en aproximadamente 2 a 3 semanas.  En cualquier momento starr la infección, también puede desarrollar:  Reinier fiebre. Escalofríos. Un dolor de whit. Un malestar estomacal.  ¿Cómo se diagnostica esto? Esta condición se diagnostica con un examen de la piel. Se pueden rene muestras de piel o fluidos de las ampollas antes de hacer un diagnóstico. Estas muestras se examinan bajo un microscopio o se envían a un laboratorio para [place] análisis.  ¿Cómo se trata esto? La erupción puede durar varias semanas. No existe reinier amanda específica para esta condición. [place] proveedor de atención médica probablemente le recetará medicamentos para ayudarlo a controlar el dolor, recuperarse más rápidamente y evitar problemas a marina plazo. Los medicamentos pueden incluir:  Medicamentos antivirales. Medicamentos antiinflamatorios. Medicamentos para el dolor. Medicamentos contra la picazón (antihistamínicos).  Si el área involucrada está en [place] helen, es posible que lo deriven a un especialista, eloise un oculista (oftalmólogo) o un otorrinolaringólogo (otorrinolaringólogo) para ayudarlo a evitar problemas oculares, dolor crónico, o discapacidad.  Siga estas instrucciones en casa:   Medicamentos  [place] los medicamentos de venta jonah y recetados solo eloise se lo indique [place] proveedor de atención médica. Aplique reinier crema contra la picazón o reinier crema anestésica en el área afectada según lo indique [place] proveedor de atención médica.    Aliviar el picor y las molestias  Aplique paños húmedos y fríos (compresas frías) en el área del sarpullido o ampollas según lo indique [place] proveedor de atención médica. Los emily fríos pueden ser relajantes. Intente agregar bicarbonato de sodio o guru seca al agua para reducir la picazón. No se bañe con [place].    Cuidado de ampollas y erupciones  Mantenga fr[place] sarpullido cubierto con un vendaje suelto (vendaje). Use ropa holgada para ayudar a aliviar el dolor del material que se frota contra la erupción. Mantenga limpios el sarpullido y las ampollas lavándose el área con un jabón suave y agua fría según lo indique fr[place] proveedor de atención médica. Revise fr[place] erupción todos los días para detectar signos de infección. Comprobar:  Más enrojecimiento, hinchazón o dolor. Líquido o misael. Calor. Pus o mal olor. No se rasque el sarpullido ni toque las ampollas. Para ayudar a evitar rascarse:  Mantenga jacqui uñas limpias y cortas. Use guantes o manoplas mientras duerme, si rascarse es un problema.    Instrucciones generales  Descanse eloise le indique [place] proveedor de atención médica. Asista a todas las visitas de seguimiento según lo indicado por [place] proveedor de atención médica. [place] es importante. Lávese las narayan frecuentemente con agua y jabón. Si no hay agua y jabón disponibles, use desinfectante para narayan. Hacer esto reduce la posibilidad de contraer reinier infección bacteriana de la piel. Antes de que jacqui ampollas se conviertan en costras, [place] infección de herpes zóster puede causar varicela en personas que nunca la navarrete tenido o que nunca navarrete sido vacunadas contra radames. Para evitar que esto suceda, evite el contacto con otras personas, especialmente:  [place]. Mujeres embarazadas. Niños que tienen eccema. Personas mayores que tienen trasplantes. Personas que tienen enfermedades crónicas, eloise cáncer o SIDA.  Comuníquese con un proveedor de atención médica si: [place] dolor no se stefanie con medicamentos recetados. [place] dolor no mejora después de que sane la erupción. Tiene signos de infección en el área de la erupción, eloise:  Más enrojecimiento, hinchazón o dolor alrededor del sarpullido. Líquido o misael que sale de la erupción. El área de la erupción se siente caliente al tacto. Pus o mal olor proveniente de la erupción.  Obtenga ayuda de inmediato si: La erupción está en [place] helen o nariz. Tiene dolor facial, dolor alrededor del área de los ojos o pérdida de sensibilidad en un lado de la helen. Tiene dificultad para piper. Tiene dolor de oído o zumbido en el oído. Tienes reinier pérdida del gusto. tu condición g More about this source textSource text required for additional translation information Send feedback Side panels History Saved Contribute 5,000 character limit. Use the arrows to translate more.

## 2022-11-12 NOTE — ED ADULT TRIAGE NOTE - CHIEF COMPLAINT QUOTE
c/o rash to left anterior and posterior side, left facial pain, left sided neck pain x 1 week,. denies cp/sob.

## 2023-08-06 ENCOUNTER — EMERGENCY (EMERGENCY)
Facility: HOSPITAL | Age: 63
LOS: 1 days | End: 2023-08-06
Attending: EMERGENCY MEDICINE
Payer: COMMERCIAL

## 2023-08-06 VITALS
DIASTOLIC BLOOD PRESSURE: 86 MMHG | RESPIRATION RATE: 18 BRPM | TEMPERATURE: 99 F | HEART RATE: 68 BPM | SYSTOLIC BLOOD PRESSURE: 133 MMHG | OXYGEN SATURATION: 99 %

## 2023-08-06 VITALS
TEMPERATURE: 98 F | HEIGHT: 62 IN | RESPIRATION RATE: 18 BRPM | OXYGEN SATURATION: 96 % | WEIGHT: 179.9 LBS | SYSTOLIC BLOOD PRESSURE: 106 MMHG | DIASTOLIC BLOOD PRESSURE: 64 MMHG | HEART RATE: 66 BPM

## 2023-08-06 PROCEDURE — T1013: CPT

## 2023-08-06 PROCEDURE — 93010 ELECTROCARDIOGRAM REPORT: CPT

## 2023-08-06 PROCEDURE — 93005 ELECTROCARDIOGRAM TRACING: CPT

## 2023-08-06 PROCEDURE — 99284 EMERGENCY DEPT VISIT MOD MDM: CPT

## 2023-08-06 PROCEDURE — 96372 THER/PROPH/DIAG INJ SC/IM: CPT

## 2023-08-06 PROCEDURE — 99283 EMERGENCY DEPT VISIT LOW MDM: CPT

## 2023-08-06 RX ORDER — LIDOCAINE 4 G/100G
1 CREAM TOPICAL ONCE
Refills: 0 | Status: COMPLETED | OUTPATIENT
Start: 2023-08-06 | End: 2023-08-06

## 2023-08-06 RX ORDER — LIDOCAINE 4 G/100G
1 CREAM TOPICAL
Qty: 2 | Refills: 0
Start: 2023-08-06 | End: 2023-08-12

## 2023-08-06 RX ORDER — IBUPROFEN 200 MG
1 TABLET ORAL
Qty: 20 | Refills: 0
Start: 2023-08-06 | End: 2023-08-10

## 2023-08-06 RX ORDER — IBUPROFEN 200 MG
1 TABLET ORAL
Qty: 21 | Refills: 0
Start: 2023-08-06 | End: 2023-08-12

## 2023-08-06 RX ORDER — LIDOCAINE 4 G/100G
1 CREAM TOPICAL
Qty: 1 | Refills: 0
Start: 2023-08-06 | End: 2023-08-10

## 2023-08-06 RX ORDER — CYCLOBENZAPRINE HYDROCHLORIDE 10 MG/1
1 TABLET, FILM COATED ORAL
Qty: 15 | Refills: 0
Start: 2023-08-06 | End: 2023-08-10

## 2023-08-06 RX ORDER — KETOROLAC TROMETHAMINE 30 MG/ML
30 SYRINGE (ML) INJECTION ONCE
Refills: 0 | Status: DISCONTINUED | OUTPATIENT
Start: 2023-08-06 | End: 2023-08-06

## 2023-08-06 RX ADMIN — LIDOCAINE 1 PATCH: 4 CREAM TOPICAL at 16:42

## 2023-08-06 RX ADMIN — Medication 30 MILLIGRAM(S): at 16:41

## 2023-08-06 NOTE — ED PROVIDER NOTE - PATIENT PORTAL LINK FT
You can access the FollowMyHealth Patient Portal offered by [facility] by registering at the following website: [email or web address]. By joining [employer]’s FollowMyHealth portal, you will also be able to view your health information using other applications (apps) compatible with our system.

## 2023-08-06 NOTE — ED PROVIDER NOTE - OBJECTIVE STATEMENT
Patient is a 64 y/o female with sig pmh of HTN, osteoperosis, cholesterol presenting with arm tingling x 1 day. Patient mentions that there is numbness/tingling below her L elbow, predominantly in her L hand and fingers. She mentions that the thumb in particular feels like there's electricity running through. There is also pain in the L lateral neck, trapezius area. Patient is a 64 y/o female with sig pmh of HTN, osteoperosis, cholesterol presenting with arm tingling x 1 day. Patient mentions that there is numbness/tingling below her L elbow, predominantly in her L hand and fingers. She mentions that the thumb in particular feels like there's electricity running through. It feels weaker/less sensation in left hand. There is also pain in the L lateral neck, trapezius area. Patient endorses sleeping on her left arm. she's had prior episodes of similar complaints but none lasted this long before. Denies fevers, chills, headache, chest pain, palpitations, shortness of breath, cough, nausea, vomiting, diarrhea, hematuria, dysuria, dark stools, focal neurologic symptoms.  Has a cardiologist; recent echo/stress test was not concerning per patient; takes aspirin 81 mg b/c of "cholesterol finding"

## 2023-08-06 NOTE — ED PROVIDER NOTE - NSFOLLOWUPINSTRUCTIONS_ED_ALL_ED_FT
Síndrome del túnel carpiano  Carpal Tunnel Syndrome    El síndrome del túnel carpiano es reinier afección que causa dolor, debilidad y adormecimiento en la mano y los dedos. El adormecimiento es cuando no siente reinier hill del cuerpo. El túnel carpiano es un área estrecha que se encuentra en el lado palmar de la lashell. Los movimientos repetidos de la lashell o determinadas enfermedades pueden causar hinchazón en el túnel. Esta hinchazón puede comprimir el nervio principal de la lashell. Lois nervio se llama “nervio mediano”.    ¿Cuáles son las causas?  Esta afección puede ser causada por lo siguiente:   la mano y la lashell reinier y otra vez mientras realiza reinier tarea.  Lesión en la lashell.  Artritis.  Un saco lleno de líquido (quiste) o un crecimiento anormal (tumor) en el túnel carpiano.  Acumulación de líquido starr el embarazo.  Uso de herramientas que vibran.  Algunas veces, la causa no se conoce.    ¿Qué incrementa el riesgo?  Los siguientes factores pueden hacer que sea más propenso a tener esta afección:  Tener un trabajo en el que deba hacer estas cosas:   la mano reinier y otra vez.  Trabajar con herramientas que vibran, eloise taladros o lijadoras.  Ser alberto.  Tener diabetes, obesidad, problemas de tiroides o insuficiencia renal.  ¿Cuáles son los signos o síntomas?  Los síntomas de esta afección incluyen:  Sensación de hormigueo en los dedos.  Hormigueo o pérdida de la sensibilidad de la mano.  Dolor en todo el brazo. Lois dolor puede empeorar al flexionar la lashell y el codo starr mucho tiempo.  Dolor en la lashell que sube por el brazo hasta el hombro.  Dolor que baja hasta la calderon de la mano o los dedos.  Debilidad en las narayan. Puede resultarle difícil rene y sostener objetos.  Es posible que se sienta peor por la noche.    ¿Cómo se trata?  El tratamiento de esta afección puede incluir:  Cambios en el estilo de yelitza. Se le pedirá que deje o cambie la actividad que causó el problema.  Hacer ejercicios y actividades para que los huesos, los músculos y los tendones se vuelvan más luis carlos (fisioterapia).  Aprender a usar la mano nuevamente (terapia ocupacional).  Medicamentos para el dolor y la hinchazón. Es posible que le apliquen inyecciones en la lashell.  Reinier férula o un dispositivo ortopédico para la lashell.  Cirugía.  Siga estas instrucciones en rubio casa:  Si tiene reinier férula o un dispositivo ortopédico:    Use la férula o el dispositivo ortopédico eloise se lo haya indicado el médico. Quíteselos solamente eloise se lo haya indicado el médico.  Afloje la férula si los dedos:  Hormiguean.  Se adormecen.  Se tornan fríos y de color ryan.  Mantenga la férula o el dispositivo ortopédico limpios.  Si la férula o el dispositivo ortopédico no son impermeables:  No deje que se mojen.  Cúbralos con un envoltorio hermético cuando tome un baño de inmersión o reinier ducha.  Control del dolor, la rigidez y la hinchazón      Si se lo indican, aplique hielo sobre la hill dolorida:  Si tiene un dispositivo ortopédico o reinier férula desmontable, quíteselos eloise se lo haya indicado el médico.  Ponga el hielo en reinier bolsa plástica.  Coloque reinier toalla entre la piel y la bolsa.  Coloque el hielo starr 20 minutos, 2 a 3 veces al día. No se quede dormido con la bolsa de hielo sobre la piel.  Retire el hielo si la piel se le pone de color doty brillante. [place] es muy importante. Si no puede sentir dolor, calor o frío, tiene un mayor riesgo de que se dañe la hill.  Mueva los dedos con frecuencia para reducir la rigidez y la hinchazón.    Instrucciones generales    [place] los medicamentos de venta jonah y los recetados solamente eloise se lo haya indicado el médico.  Descanse la lashell de cualquier actividad que le cause dolor. Si es necesario, hable con rubio jefe en el trabajo sobre los cambios que pueden ayudar a la curación de la lashell.  Brent los ejercicios que le hayan indicado el médico, el fisioterapeuta o el terapeuta ocupacional.  Cumpla con todas las visitas de seguimiento.  Comuníquese con un médico si:  Aparecen nuevos síntomas.  Los medicamentos no le alivian el dolor.  Vibha síntomas empeoran.  Solicite ayuda de inmediato si:  Tiene adormecimiento u hormigueo muy intensos en la lashell o la mano.  Resumen  El síndrome del túnel carpiano es reinier afección que causa dolor en la mano y en el brazo.  Suele deberse a movimientos repetidos de la lashell.  Lois problema se trata mediante cambios en el estilo de yelitza y medicamentos. La cirugía puede ser necesaria en casos muy graves.  Siga las instrucciones del médico sobre el uso de reinier férula, el reposo de la lashell, la asistencia a las consultas de seguimiento y llamar para pedir ayuda.  Esta información no tiene eloise fin reemplazar el consejo del médico. Asegúrese de hacerle al médico cualquier pregunta que tenga.

## 2023-08-06 NOTE — ED ADULT NURSE NOTE - NSFALLUNIVINTERV_ED_ALL_ED
Bed/Stretcher in lowest position, wheels locked, appropriate side rails in place/Call bell, personal items and telephone in reach/Instruct patient to call for assistance before getting out of bed/chair/stretcher/Non-slip footwear applied when patient is off stretcher/[place] to call system/Physically safe environment - no spills, clutter or unnecessary equipment/Purposeful proactive rounding/Room/bathroom lighting operational, light cord in reach

## 2023-08-06 NOTE — ED PROVIDER NOTE - PHYSICAL EXAMINATION
General: Well appearing in no acute distress, alert and cooperative  Head: Normocephalic, atraumatic  Eyes: PERRLA, no conjunctival injection, no scleral icterus, EOMI  ENMT: Atraumatic external nose and ears, moist mucous membranes, oropharynx clear  Neck: Soft and supple, full ROM without pain, no midline tenderness, no thyromegaly,  no lymphadenopathy  Cardiac: Regular rate and regular rhythm, no murmurs, peripheral pulses 2+ and symmetric in all extremities, no LE edema.  Resp: Unlabored respiratory effort, lungs CTAB, speaking in full sentences, no wheezes  Abd: Soft, non-tender, non-distended, no guarding or rebound  MSK: Spine midline and non-tender, no CVA tenderness   Skin: Warm and dry, no rashes/abrasions/lacerations  Neuro: AO x 3, moves all extremities symmetrically, Motor strength 5/5 bilaterally UE and LE, sensation grossly intact,though patient endorses less sensation in L forearm and hand - Tinel's sign Left elbow,

## 2023-08-06 NOTE — ED PROVIDER NOTE - ATTENDING CONTRIBUTION TO CARE
I personally saw the patient with the resident, and completed the key components of the history and physical exam. I then discussed the management plan with the resident.    64 y/o F with PMH HTN presents for left shoulder/arm pain that she describes as "electric-like" pain in her ulnar distribution and feels "tingling" when she ulnar deviates her wrist. She has been seen in the past for the same. She saw her cardiologist Dr. Klein in December, everything was normal at that time, she has another appointment in September. She denies chest pain, SOB.    PE - NAD, well appearing, + TTP left wrist, left shoulder, 5/5  strength, negative Tinel's sign, full ROM left hand/wrist/elbow/shoulder, 2+ symmetrical distal pulse, sensation intact.    EKG WNL, pain control - reassurance provided, outpatient follow up.

## 2023-08-06 NOTE — ED ADULT NURSE NOTE - OBJECTIVE STATEMENT
Pt is here with c/o upper back pain, neck pain  since yesterday.  states she started having L shoulder pain, arm pain and numbness today.  Pt is moving all extremities.  Denies injury.

## 2023-08-06 NOTE — ED PROVIDER NOTE - CLINICAL SUMMARY MEDICAL DECISION MAKING FREE TEXT BOX
Patient is a 62 y/o female with sig pmh of HTN, osteoperosis, cholesterol presenting with arm tingling x 1 day. Patient mentions that there is numbness/tingling below her L elbow, predominantly in her L hand and fingers.   Likely MSK/neuro related than cardiac cause  Differential diagnosis includes but not limited to nerve impingement, muscle strain  Will administer pain medication and patches and reassess

## 2023-08-06 NOTE — ED ADULT TRIAGE NOTE - CHIEF COMPLAINT QUOTE
pt c/o upper back and neck pain yesterday and today having left shoulder and arm pain and left arm numbness

## 2023-08-21 NOTE — ED PROVIDER NOTE - CPE EDP MUSC NORM
-- DO NOT REPLY / DO NOT REPLY ALL --  -- Message is from [facility] [employer] ([facility]) --    General Patient Message: Patient was told by urgent care provider (Fina Conley - [facility]) to see a gastroenterologist for difficulty swallowing with chest pains  No available appointments for [facility] to schedule.    Caller Information       Type Contact Phone/Fax    08/21/2023 10:02 AM CDT Phone (Incoming) Henry Ocampo (Self) [phone] (M)        Alternative phone number: none    Can a detailed message be left? Yes    Message Turnaround:     Is it Working Hours? Yes - Working Hours     [place]:    Please give this turnaround time to the caller:   \"This message will be sent to [state Provider's name]. The clinical team will fulfill your request as soon as they review your message.\"                
normal...

## 2023-09-11 NOTE — ED ADULT TRIAGE NOTE - CHIEF COMPLAINT QUOTE
English left sided chest/flank pain c/o burning radiating to her mid chest. seen for same at [facility] and told to follow up with primary but it was saturday. has appointment with cardiology next week. here because she has a lot of pain.

## 2024-07-29 NOTE — ED PROVIDER NOTE - NSFOLLOWUPINSTRUCTIONS_ED_ALL_ED_FT
No
Dolor de hombro    LO QUE NECESITA SABER:    El dolor de hombro es un problema común que puede afectar jacqui actividades diarias. El dolor puede ser causado por un problema en rubio hombro, eloise la irritación de un tendón o bursa. Los tendones son cuerdas de tejido resistente que conectan los músculos a los huesos. La bursa es reinier bolsa (saco) llena de líquido que actúa eloise colchón entre un hueso y un tendón. El dolor de hombro también puede ser causado por el dolor que se propaga hacia rubio hombro de otra parte de rubio cuerpo.  Anatomía del hombro         INSTRUCCIONES SOBRE EL ANTONIO HOSPITALARIA:    Regrese a la junior de emergencias si:  •Usted tiene dolor intenso.      •Usted no puede  rubio brazo ni rubio hombro.      •Usted tiene entumecimiento u hormigueo en el hombro o en el brazo.      Comuníquese con rubio médico si:  •Rubio dolor empeora o no desaparece con el tratamiento.      •Usted tiene dificultad para  el brazo o el hombro.      •Usted tiene preguntas o inquietudes acerca de rubio condición o cuidado.      Medicamentos:Es posible que usted necesite alguno de los siguientes:   •Acetaminofénalivia el dolor y baja la fiebre. Está disponible sin receta médica. Pregunte la cantidad y la frecuencia con que debe tomarlos. Siga las indicaciones. Melissa las etiquetas de todos los demás medicamentos que esté usando para saber si también contienen acetaminofén, o pregunte a rubio médico o farmacéutico. El acetaminofén puede causar daño en el hígado cuando no se noemi de forma correcta.      •AINEcomo el ibuprofeno, ayudan a disminuir la inflamación, el dolor y la fiebre. Adryan medicamento está disponible con o sin reinier receta médica. Los LAVONNE pueden causar sangrado estomacal o problemas renales en ciertas personas. Si usted noemi un medicamento anticoagulante, siempre pregúntele a rubio médico si los LAVONNE son seguros para usted. Siempre melissa la etiqueta de adryan medicamento y siga las instrucciones.      •[place] jacqui medicamentos eloise se le haya indicado.Consulte con rubio médico si usted dexter que rubio medicamento no le está ayudando o si presenta efectos secundarios. Infórmele al médico si usted es alérgico a algún medicamento. Mantenga reinier lista actualizada de los medicamentos, las vitaminas y los productos herbales que noemi. Incluya los siguientes datos de los medicamentos: cantidad, frecuencia y motivo de administración. Traiga con usted la lista o los envases de las píldoras a jacqui citas de seguimiento. Lleve la lista de los medicamentos con usted en shikha de reinier emergencia.      El manejo de jacqui síntomas:  •Aplique hieloen el hombro de 20 a 30 minutos cada 2 horas o eloise se lo indiquen. Use reinier compresa de hielo o ponga hielo triturado en reinier bolsa de plástico. Cúbrala con reinier toalla antes de aplicarla sobre el hombro. El hielo ayuda a evitar daño al tejido y a disminuir la inflamación y el dolor.      •Use calor si el hielo no le está ayudando con jacqui síntomas.Aplique calor sobre el hombro starr 20 a 30 minutos cada 2 horas starr tantos días eloise le indiquen. El calor ayuda a disminuir el dolor y los espasmos musculares.      •Limite las actividades eloise se le indique.Trate de evitar movimientos repetidos arriba de la whit.      •Asista a terapia física u ocupacional eloise se le indique.Un fisioterapeuta le puede enseñar ejercicios para ayudarle a mejorar el movimiento y la fuerza, y para disminuir el dolor. Un terapeuta ocupacional le enseña habilidades para ayudarlo con jacqui actividades diarias.      Prevenga el dolor de hombro:  •Mantenga un buen rango de movimiento en el hombro.Pregúntele a rubio médico qué ejercicios debe hacer de forma regular después de baljit sanado.      •Haciendo ejercicios de estiramiento y fortalecimiento para rubio hombro.Use la técnica apropiada starr los ejercicios y deportes.      Brent reinier justine de seguimiento con rubio médico u ortopedista eloise se le indique:Anote jacqui preguntas para que se acuerde de hacerlas starr jacqui visitas.

## 2024-12-02 PROBLEM — I10 ESSENTIAL (PRIMARY) HYPERTENSION: Chronic | Status: ACTIVE | Noted: 2023-08-06

## 2024-12-02 PROBLEM — M81.0 AGE-RELATED OSTEOPOROSIS WITHOUT CURRENT PATHOLOGICAL FRACTURE: Chronic | Status: ACTIVE | Noted: 2023-08-06

## 2024-12-20 ENCOUNTER — APPOINTMENT (OUTPATIENT)
Dept: NEUROLOGY | Facility: CLINIC | Age: 64
End: 2024-12-20
Payer: MEDICARE

## 2024-12-20 VITALS
DIASTOLIC BLOOD PRESSURE: 76 MMHG | HEART RATE: 76 BPM | WEIGHT: 185 LBS | SYSTOLIC BLOOD PRESSURE: 120 MMHG | BODY MASS INDEX: 34.04 KG/M2 | OXYGEN SATURATION: 98 % | HEIGHT: 62 IN

## 2024-12-20 DIAGNOSIS — R51.9 HEADACHE, UNSPECIFIED: ICD-10-CM

## 2024-12-20 PROCEDURE — G2211 COMPLEX E/M VISIT ADD ON: CPT

## 2024-12-20 PROCEDURE — 99204 OFFICE O/P NEW MOD 45 MIN: CPT

## 2024-12-20 RX ORDER — ALENDRONATE SODIUM 70 MG/1
70 TABLET ORAL
Refills: 0 | Status: ACTIVE | COMMUNITY

## 2024-12-20 RX ORDER — LISINOPRIL 10 MG/1
10 TABLET ORAL
Refills: 0 | Status: ACTIVE | COMMUNITY

## 2024-12-20 RX ORDER — ASPIRIN 81 MG/1
81 TABLET ORAL
Refills: 0 | Status: ACTIVE | COMMUNITY

## 2024-12-20 RX ORDER — HYDROCHLOROTHIAZIDE 25 MG/1
25 TABLET ORAL
Refills: 0 | Status: ACTIVE | COMMUNITY

## 2025-01-17 ENCOUNTER — OUTPATIENT (OUTPATIENT)
Dept: OUTPATIENT SERVICES | Facility: HOSPITAL | Age: 65
LOS: 1 days | End: 2025-01-17

## 2025-01-17 ENCOUNTER — APPOINTMENT (OUTPATIENT)
Dept: MRI IMAGING | Facility: CLINIC | Age: 65
End: 2025-01-17
Payer: MEDICARE

## 2025-01-17 DIAGNOSIS — R51.9 HEADACHE, UNSPECIFIED: ICD-10-CM

## 2025-01-17 PROCEDURE — 70551 MRI BRAIN STEM W/O DYE: CPT | Mod: 26

## 2025-03-07 ENCOUNTER — APPOINTMENT (OUTPATIENT)
Dept: NEUROLOGY | Facility: CLINIC | Age: 65
End: 2025-03-07
Payer: MEDICARE

## 2025-03-07 VITALS
SYSTOLIC BLOOD PRESSURE: 110 MMHG | HEART RATE: 71 BPM | WEIGHT: 183 LBS | OXYGEN SATURATION: 97 % | DIASTOLIC BLOOD PRESSURE: 62 MMHG | HEIGHT: 62 IN | BODY MASS INDEX: 33.68 KG/M2

## 2025-03-07 DIAGNOSIS — R51.9 HEADACHE, UNSPECIFIED: ICD-10-CM

## 2025-03-07 DIAGNOSIS — J33.8 OTHER POLYP OF SINUS: ICD-10-CM

## 2025-03-07 DIAGNOSIS — E23.6 OTHER DISORDERS OF PITUITARY GLAND: ICD-10-CM

## 2025-03-07 PROCEDURE — 99214 OFFICE O/P EST MOD 30 MIN: CPT

## 2025-04-04 ENCOUNTER — APPOINTMENT (OUTPATIENT)
Dept: OTOLARYNGOLOGY | Facility: CLINIC | Age: 65
End: 2025-04-04
Payer: MEDICARE

## 2025-04-04 VITALS
HEART RATE: 75 BPM | SYSTOLIC BLOOD PRESSURE: 134 MMHG | WEIGHT: 185 LBS | HEIGHT: 62 IN | DIASTOLIC BLOOD PRESSURE: 84 MMHG | BODY MASS INDEX: 34.04 KG/M2

## 2025-04-04 DIAGNOSIS — J32.1 CHRONIC FRONTAL SINUSITIS: ICD-10-CM

## 2025-04-04 DIAGNOSIS — J33.8 OTHER POLYP OF SINUS: ICD-10-CM

## 2025-04-04 PROCEDURE — 99204 OFFICE O/P NEW MOD 45 MIN: CPT | Mod: 25

## 2025-04-04 PROCEDURE — 31231 NASAL ENDOSCOPY DX: CPT

## 2025-04-26 ENCOUNTER — OUTPATIENT (OUTPATIENT)
Dept: OUTPATIENT SERVICES | Facility: HOSPITAL | Age: 65
LOS: 1 days | End: 2025-04-26
Payer: MEDICARE

## 2025-04-26 ENCOUNTER — APPOINTMENT (OUTPATIENT)
Dept: CT IMAGING | Facility: CLINIC | Age: 65
End: 2025-04-26

## 2025-04-26 DIAGNOSIS — J33.8 OTHER POLYP OF SINUS: ICD-10-CM

## 2025-04-26 PROCEDURE — 70486 CT MAXILLOFACIAL W/O DYE: CPT | Mod: 26

## 2025-05-20 ENCOUNTER — APPOINTMENT (OUTPATIENT)
Dept: OTOLARYNGOLOGY | Facility: CLINIC | Age: 65
End: 2025-05-20
Payer: MEDICARE

## 2025-05-20 VITALS
HEIGHT: 62 IN | SYSTOLIC BLOOD PRESSURE: 121 MMHG | WEIGHT: 185 LBS | HEART RATE: 75 BPM | BODY MASS INDEX: 34.04 KG/M2 | DIASTOLIC BLOOD PRESSURE: 80 MMHG

## 2025-05-20 DIAGNOSIS — J32.1 CHRONIC FRONTAL SINUSITIS: ICD-10-CM

## 2025-05-20 DIAGNOSIS — J33.8 OTHER POLYP OF SINUS: ICD-10-CM

## 2025-05-20 PROCEDURE — 99213 OFFICE O/P EST LOW 20 MIN: CPT

## 2025-06-13 ENCOUNTER — APPOINTMENT (OUTPATIENT)
Dept: DERMATOLOGY | Facility: CLINIC | Age: 65
End: 2025-06-13
Payer: MEDICARE

## 2025-06-13 PROCEDURE — 99204 OFFICE O/P NEW MOD 45 MIN: CPT

## 2025-07-02 NOTE — ED ADULT NURSE NOTE - ED CARDIAC CAPILLARY REFILL
PROGRESS NOTE    Subjective   CC: Mable is a 19 year old here for Eye Pain  (Pt had a mosquito bite last night, left eye swelling/Took allegra and itch cream/No anaphylactic reaction)     The patient is a 19-year-old female who presents for evaluation of an insect bite. She is accompanied by her mother.    She was in her backyard last night around 9 PM, where she believes she was bitten by a large mosquito. Immediate itching was experienced, but swelling and redness were not noticed until this morning. Her mother administered Allegra and applied triamcinolone cream to the affected area last night. Ice was also applied to the area for 20 minutes. She denies headaches, shortness of breath, wheezing, throat or lip tightness, facial swelling, HA, dizziness, abdominal pain, N/V/D.  Eating and drinking have been normal today. There is no sinus congestion, sore throat, voice change or ear pain. Her vision is unaffected, and no crusting was noticed upon waking up.  She denies eye pain , purulent discharge or blurred vision.  She has a history of immediate reactions to insect bites but usually mild until today.  UTD on tdap vaccine.     GYNECOLOGICAL HISTORY:  - Age of Menarche: 18  - Last Menstrual Period: 06/19/2025    Review of Systems  As documented above.    SDOH Never Smoker          Objective   Vitals:    07/02/25 1618   BP: 126/80   Pulse: 96   Temp: 98.2 °F (36.8 °C)   TempSrc: Oral   SpO2: 99%   Height: 4' 8\" (1.422 m)     Physical Exam  Vitals and nursing note reviewed.   Constitutional:       General: She is not in acute distress.     Appearance: Normal appearance. She is not ill-appearing, toxic-appearing or diaphoretic.   HENT:      Head: Normocephalic and atraumatic.        Comments: No \"bulls'eye-appearance.\"     Nose: No congestion or rhinorrhea.      Right Turbinates: Not swollen.      Left Turbinates: Not swollen.      Right Sinus: No maxillary sinus tenderness or frontal sinus tenderness.      Left  Sinus: No maxillary sinus tenderness or frontal sinus tenderness.      Mouth/Throat:      Mouth: No angioedema.      Pharynx: Uvula midline. No pharyngeal swelling, posterior oropharyngeal erythema or uvula swelling.      Neck: Normal range of motion.   Eyes:      General: Lids are normal. No scleral icterus.        Left eye: No discharge or hordeolum.      Conjunctiva/sclera:      Left eye: Left conjunctiva is not injected. No chemosis or exudate.     Comments: Left upper eyelid swollen and mildly erythematous.     Cardiovascular:      Rate and Rhythm: Normal rate.      Heart sounds: Normal heart sounds.   Pulmonary:      Effort: Pulmonary effort is normal. No tachypnea, prolonged expiration or respiratory distress.      Breath sounds: Normal breath sounds. No stridor. No wheezing, rhonchi or rales.   Abdominal:      General: Bowel sounds are normal. There is no distension.      Tenderness: There is no abdominal tenderness. There is no right CVA tenderness, left CVA tenderness, guarding or rebound.   Musculoskeletal:         General: No tenderness. Normal range of motion.   Lymphadenopathy:      Cervical: No cervical adenopathy.   Skin:     General: Skin is warm.      Findings: No rash.   Neurological:      General: No focal deficit present.      Mental Status: She is alert and oriented to person, place, and time.   Psychiatric:         Mood and Affect: Mood normal.            ASSESSMENT AND PLAN           1. Bug bite with infection, initial encounter  -     predniSONE (DELTASONE) tablet 40 mg  -     diphenhydrAMINE (BENADRYL) capsule 25 mg  -     famotidine (PEPCID) tablet 20 mg  Other orders  -     predniSONE (DELTASONE) 20 MG tablet; Take 1 tablet by mouth 2 times daily for 4 days.  -     doxycycline hyclate (VIBRAMYCIN) 100 MG capsule; Take 1 capsule by mouth in the morning and 1 capsule in the evening. Do all this for 7 days.      -Based on my history, and physical examination , the patient appears to have  symptoms consistent with bug bite with infection.  DD to include preseptal cellulitis, tick bite or lyme disease.   - The eye exhibits noticeable swelling, but there is no significant conjunctival injection, purulent discharge, photophobia, or discomfort with EOM  - Physical examination reveals eyelid swelling and mild erythema ; no signs of angioedema or periorbital or orbial  cellulitis.  - Medicated with prednisone 40 mg, benadryl and Pepcid in the clinic today.  - Discussion included the use of Benadryl for immediate relief,  and other non antihistamines such as allegra, zrytec or claritin  and the importance of monitoring for any worsening symptoms.  - Prescribed a course of steroids (to start in am)  and antibiotics-(Rx doxycycline due to allergies; OTC Benadryl 25 mg every 6 hours for the next 24 hours; over-the-counter Pepcid for a few days; ice application to reduce swelling.  -Advised PCP follow-up for management of ongoing symptoms.  -Strict ED precautions and alarming signs advised.   -Diagnosis, exam findings and treatment plan reviewed with patient at time of visit.       Patient given proper dosing instructions and side effect profiles.  Patient  verbalized understanding of all information, including home instructions.   All questions answered. Instructions provided as documented in the AVS.    Electronically signed by HAILE Billings      Voice recognition software has been used to create this note.  Errors in content may be related to improper recognition by the software.  Efforts to review and correct have been done but some errors may still exist.     Note to patient: The 21st Century Cures Act makes medical notes like these available to patients in the interest of transparency. However, be advised this is a medical document. It is intended as peer to peer communication. It is written in medical language and may contain abbreviations or verbiage that are unfamiliar. It may appear blunt or  direct. Medical documents are intended to carry relevant information, facts as evident, and the clinical opinion of the practitioner.   2 seconds or less